# Patient Record
Sex: FEMALE | Race: WHITE | NOT HISPANIC OR LATINO | URBAN - METROPOLITAN AREA
[De-identification: names, ages, dates, MRNs, and addresses within clinical notes are randomized per-mention and may not be internally consistent; named-entity substitution may affect disease eponyms.]

---

## 2019-05-20 ENCOUNTER — COMMUNICATION - HEALTHEAST (OUTPATIENT)
Dept: TELEHEALTH | Facility: CLINIC | Age: 24
End: 2019-05-20

## 2019-05-20 ENCOUNTER — OFFICE VISIT - HEALTHEAST (OUTPATIENT)
Dept: FAMILY MEDICINE | Facility: CLINIC | Age: 24
End: 2019-05-20

## 2019-05-20 DIAGNOSIS — Z30.011 ENCOUNTER FOR INITIAL PRESCRIPTION OF CONTRACEPTIVE PILLS: ICD-10-CM

## 2019-05-20 DIAGNOSIS — F41.9 ANXIETY: ICD-10-CM

## 2019-05-20 LAB — HCG UR QL: NEGATIVE

## 2019-05-20 RX ORDER — ETHYNODIOL DIACETATE AND ETHINYL ESTRADIOL 1 MG-35MCG
1 KIT ORAL DAILY
Qty: 30 TABLET | Refills: 11 | Status: SHIPPED | OUTPATIENT
Start: 2019-05-20

## 2019-05-20 RX ORDER — ESCITALOPRAM OXALATE 10 MG/1
10 TABLET ORAL DAILY
Qty: 30 TABLET | Refills: 12 | Status: SHIPPED | OUTPATIENT
Start: 2019-05-20

## 2019-05-20 ASSESSMENT — MIFFLIN-ST. JEOR: SCORE: 1557.93

## 2019-05-21 LAB
C TRACH DNA SPEC QL PROBE+SIG AMP: NEGATIVE
N GONORRHOEA DNA SPEC QL NAA+PROBE: NEGATIVE

## 2019-05-23 ENCOUNTER — RECORDS - HEALTHEAST (OUTPATIENT)
Dept: ADMINISTRATIVE | Facility: OTHER | Age: 24
End: 2019-05-23

## 2021-05-29 NOTE — PROGRESS NOTES
Urine GC/Chl is negative and  normal, please call results to the patient or send a letter if not reachable by phone.

## 2021-05-29 NOTE — PROGRESS NOTES
HPI:  Doris Lucas is a 24 y.o. female who is seen for   Chief Complaint   Patient presents with     Birth control     Anxiety   Doris Lucas is seen as new patient in my practice, is almost out of her birth control and her Lexapro 10 mg.  She has done very well on these 2 medications and would like to have refills.  She denies any menorrhagia or pelvic pain, her last menstrual period was 4/25/2019, her periods are regular and not heavy.  She takes Lexapro for anxiety, her anxiety is well controlled on this medication.  He has not run out of this medication either and has no current headache, dizziness, nausea, confusion, palpitations, chest pain.  She is also planning a trip to [facility] for 2 weeks.  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  If she needs to get a travel consult done.  ROS: Patient denies fever, chills, sweats, fainting, fatigue, weight change, dizziness, sleep problems, chest pain, palpitations, shortness of breath, wheezing, cough,  sore throat, changes in hearing, ear pain,tinnitus,  disphagia, sore throat, globus, changes in vision, eye pain eye redness, acid reflux, nausea, vomiting, diarrhea, constipation, black or bloody stools,  Dysuria, frequency, urinary incontinence, nocturia, hematuria, back pain,joint pain, bone pain, muscle cramps,edema, weakness, numbness, tingling of extremities, rash, itching, skin changes, swollen lymph nodes, thirst, increased urination, breast lumps, breast pain, nipple discharge, memory difficulties, anxiety, mood swings, (female)vaginal discharge, dyspareunia, menorrhagia, pelvic pain, sexual dysfunction,       No results found for: HGBA1C  No results found for: GLUF, MICROALBUR, LDLCALC, CREATININE  There is no problem list on file for this patient.    No family history on file.  Social History     Socioeconomic History     Marital status: Single     Spouse name: None     Number of children: None     Years of education: None     Highest education  level: None   Occupational History     None   Social Needs     Financial resource strain: None     Food insecurity:     Worry: None     Inability: None     Transportation needs:     Medical: None     Non-medical: None   Tobacco Use     Smoking status: Never Smoker     Smokeless tobacco: Never Used   Substance and Sexual Activity     Alcohol use: Never     Frequency: Never     Drug use: Never     Sexual activity: Yes     Partners: Male   Lifestyle     Physical activity:     Days per week: None     Minutes per session: None     Stress: None   Relationships     Social connections:     Talks on phone: None     Gets together: None     Attends [employer] service: None     Active member of club or organization: None     Attends meetings of clubs or organizations: None     Relationship status: None     Intimate partner violence:     Fear of current or ex partner: None     Emotionally abused: None     Physically abused: None     Forced sexual activity: None   Other Topics Concern     None   Social History Narrative     None     No past surgical history on file.  No current outpatient medications on file prior to visit.     No current facility-administered medications on file prior to visit.      Allergies   Allergen Reactions     Amoxicillin-Pot Clavulanate Hives     OB History    None       I have reviewed the patient's medical history in detail and updated the computerized patient record.  OBJECTIVE:  Wt Readings from Last 3 Encounters:   05/20/19 168 lb 4.8 oz (76.3 kg)     Temp Readings from Last 3 Encounters:   No data found for Temp     BP Readings from Last 3 Encounters:   05/20/19 108/58     Pulse Readings from Last 3 Encounters:   05/20/19 78     Body mass index is 25.78 kg/m .     Alert, cooperative, well-hydrated. Appears well.  Eyes: Pupils equal, round, reactive to light.  HEENT: Sclera white, nares patent, MMM,   Neck: supple, without lymphadenopathy, Thyroid freely movable and without hypotrophy or nodularity.    Lungs: Clear to auscultation. No retractions, no increased work of respiration, equal chest rise.   Heart: Regular rate and rhythm, no murmurs, clicks,   Gallops.  Abdomen: Soft, bowel sounds in 4 quadrants with no tenderness to palpation, no organomegaly or masses, no aortic or renal bruits.  Extremities: no tenderness to palpation of gastrocnemius, bilaterally.  Skin: no increased warmth, edema, or erythema of lower legs bilaterally.  Back: No cervical, thoracic or lumbar tenderness to spinous processes or musculature.  Neuro::pupils equal and reactive to light bilaterally, CN II - XII grossly intact.   Labs:  No results found for any previous visit.     ASSESMENT/PLAN:  1. Encounter for initial prescription of contraceptive pills  Chlamydia trachomatis & Neisseria gonorrhoeae, Amplified Detection    Pregnancy, Urine    ZOVIA 1/35E, 28, 1-35 mg-mcg per tablet   2. Anxiety  escitalopram oxalate (LEXAPRO) 10 MG tablet   We will continue Lexapro at 10 mg daily and Zovia birth control daily.  Follow-up in 6 months for physical and fasting labs.  She will also have her first Pap at this point.  [facility], given address and phone number of local clinics because she will need to have the yellow fever vaccination.  Nichole Taylor, MS, PA-C 05/21/19

## 2021-06-03 VITALS — HEIGHT: 68 IN | WEIGHT: 168.3 LBS | BODY MASS INDEX: 25.51 KG/M2
